# Patient Record
Sex: MALE | Race: WHITE | NOT HISPANIC OR LATINO | Employment: OTHER | URBAN - METROPOLITAN AREA
[De-identification: names, ages, dates, MRNs, and addresses within clinical notes are randomized per-mention and may not be internally consistent; named-entity substitution may affect disease eponyms.]

---

## 2018-01-30 ENCOUNTER — HOSPITAL ENCOUNTER (OUTPATIENT)
Facility: MEDICAL CENTER | Age: 83
End: 2018-01-30
Attending: OPHTHALMOLOGY | Admitting: OPHTHALMOLOGY
Payer: MEDICARE

## 2018-01-30 VITALS
HEIGHT: 72 IN | RESPIRATION RATE: 16 BRPM | HEART RATE: 59 BPM | SYSTOLIC BLOOD PRESSURE: 125 MMHG | BODY MASS INDEX: 19.56 KG/M2 | OXYGEN SATURATION: 98 % | TEMPERATURE: 97.8 F | DIASTOLIC BLOOD PRESSURE: 77 MMHG | WEIGHT: 144.4 LBS

## 2018-01-30 LAB
ANION GAP SERPL CALC-SCNC: 5 MMOL/L (ref 0–11.9)
BUN SERPL-MCNC: 20 MG/DL (ref 8–22)
CALCIUM SERPL-MCNC: 9.7 MG/DL (ref 8.5–10.5)
CHLORIDE SERPL-SCNC: 107 MMOL/L (ref 96–112)
CO2 SERPL-SCNC: 28 MMOL/L (ref 20–33)
CREAT SERPL-MCNC: 0.88 MG/DL (ref 0.5–1.4)
EKG IMPRESSION: NORMAL
GLUCOSE SERPL-MCNC: 95 MG/DL (ref 65–99)
POTASSIUM SERPL-SCNC: 4.1 MMOL/L (ref 3.6–5.5)
SODIUM SERPL-SCNC: 140 MMOL/L (ref 135–145)

## 2018-01-30 PROCEDURE — 80048 BASIC METABOLIC PNL TOTAL CA: CPT

## 2018-01-30 PROCEDURE — 500791 HCHG KNIFE, SLIT: Performed by: OPHTHALMOLOGY

## 2018-01-30 PROCEDURE — 99152 MOD SED SAME PHYS/QHP 5/>YRS: CPT | Performed by: OPHTHALMOLOGY

## 2018-01-30 PROCEDURE — 160048 HCHG OR STATISTICAL LEVEL 1-5: Performed by: OPHTHALMOLOGY

## 2018-01-30 PROCEDURE — 160002 HCHG RECOVERY MINUTES (STAT): Performed by: OPHTHALMOLOGY

## 2018-01-30 PROCEDURE — 160035 HCHG PACU - 1ST 60 MINS PHASE I: Performed by: OPHTHALMOLOGY

## 2018-01-30 PROCEDURE — 500558 HCHG EYE SHIELD W/GARTER (FOX): Performed by: OPHTHALMOLOGY

## 2018-01-30 PROCEDURE — 700101 HCHG RX REV CODE 250

## 2018-01-30 PROCEDURE — 99153 MOD SED SAME PHYS/QHP EA: CPT | Performed by: OPHTHALMOLOGY

## 2018-01-30 PROCEDURE — 700111 HCHG RX REV CODE 636 W/ 250 OVERRIDE (IP)

## 2018-01-30 PROCEDURE — A6410 STERILE EYE PAD: HCPCS | Performed by: OPHTHALMOLOGY

## 2018-01-30 PROCEDURE — 160036 HCHG PACU - EA ADDL 30 MINS PHASE I: Performed by: OPHTHALMOLOGY

## 2018-01-30 PROCEDURE — 160041 HCHG SURGERY MINUTES - EA ADDL 1 MIN LEVEL 4: Performed by: OPHTHALMOLOGY

## 2018-01-30 PROCEDURE — 93005 ELECTROCARDIOGRAM TRACING: CPT | Performed by: OPHTHALMOLOGY

## 2018-01-30 PROCEDURE — 87102 FUNGUS ISOLATION CULTURE: CPT

## 2018-01-30 PROCEDURE — 501425 HCHG SPONGE, WECKCEL SPEAR: Performed by: OPHTHALMOLOGY

## 2018-01-30 PROCEDURE — 160029 HCHG SURGERY MINUTES - 1ST 30 MINS LEVEL 4: Performed by: OPHTHALMOLOGY

## 2018-01-30 PROCEDURE — 93010 ELECTROCARDIOGRAM REPORT: CPT | Performed by: INTERNAL MEDICINE

## 2018-01-30 RX ORDER — MOXIFLOXACIN 5 MG/ML
SOLUTION/ DROPS OPHTHALMIC
Status: COMPLETED
Start: 2018-01-30 | End: 2018-01-30

## 2018-01-30 RX ORDER — BUPIVACAINE HYDROCHLORIDE 7.5 MG/ML
INJECTION, SOLUTION EPIDURAL; RETROBULBAR
Status: DISCONTINUED | OUTPATIENT
Start: 2018-01-30 | End: 2018-01-30 | Stop reason: HOSPADM

## 2018-01-30 RX ORDER — SODIUM CHLORIDE, SODIUM LACTATE, POTASSIUM CHLORIDE, CALCIUM CHLORIDE 600; 310; 30; 20 MG/100ML; MG/100ML; MG/100ML; MG/100ML
INJECTION, SOLUTION INTRAVENOUS CONTINUOUS
Status: DISCONTINUED | OUTPATIENT
Start: 2018-01-30 | End: 2018-01-30

## 2018-01-30 RX ORDER — SODIUM CHLORIDE, SODIUM LACTATE, POTASSIUM CHLORIDE, CALCIUM CHLORIDE 600; 310; 30; 20 MG/100ML; MG/100ML; MG/100ML; MG/100ML
INJECTION, SOLUTION INTRAVENOUS CONTINUOUS
Status: DISCONTINUED | OUTPATIENT
Start: 2018-01-30 | End: 2018-01-30 | Stop reason: HOSPADM

## 2018-01-30 RX ORDER — TETRACAINE HYDROCHLORIDE 5 MG/ML
SOLUTION OPHTHALMIC
Status: DISCONTINUED | OUTPATIENT
Start: 2018-01-30 | End: 2018-01-30 | Stop reason: HOSPADM

## 2018-01-30 RX ORDER — LIDOCAINE HYDROCHLORIDE 20 MG/ML
INJECTION, SOLUTION EPIDURAL; INFILTRATION; INTRACAUDAL; PERINEURAL
Status: DISCONTINUED | OUTPATIENT
Start: 2018-01-30 | End: 2018-01-30 | Stop reason: HOSPADM

## 2018-01-30 RX ADMIN — MOXIFLOXACIN HYDROCHLORIDE 1 DROP: 5 SOLUTION/ DROPS OPHTHALMIC at 08:25

## 2018-01-30 RX ADMIN — SODIUM CHLORIDE, SODIUM LACTATE, POTASSIUM CHLORIDE, CALCIUM CHLORIDE: 600; 310; 30; 20 INJECTION, SOLUTION INTRAVENOUS at 08:30

## 2018-01-30 ASSESSMENT — PAIN SCALES - GENERAL
PAINLEVEL_OUTOF10: 0

## 2018-01-30 NOTE — OR NURSING
0947.  To pacu in stable condition.  VS stable.  LS clear bilat.  Dressing to right eye d/i..  Pt flat for 60 min per MD order.  1030. Friend at bedside.  Dc instr. Reviewed.  VS stable.    1100  No c/o pain,Nausea  Or vomiting.  Up to dress and void at 1115.  Ready to dc home. IV out.  1120 dc home via WC to friend.

## 2018-01-30 NOTE — DISCHARGE INSTRUCTIONS
ACTIVITY: Rest and take it easy for the first 24 hours.  A responsible adult is recommended to remain with you during that time.  It is normal to feel sleepy.  We encourage you to not do anything that requires balance, judgment or coordination.    MILD FLU-LIKE SYMPTOMS ARE NORMAL. YOU MAY EXPERIENCE GENERALIZED MUSCLE ACHES, THROAT IRRITATION, HEADACHE AND/OR SOME NAUSEA.    FOR 24 HOURS DO NOT:  Drive, operate machinery or run household appliances.  Drink beer or alcoholic beverages.   Make important decisions or sign legal documents.    SPECIAL INSTRUCTIONS: *remain as flat as possible until your appointment with the doctor 1-31-18*, no bending , stooping, or lifting until approved by MD*    DIET: To avoid nausea, slowly advance diet as tolerated, avoiding spicy or greasy foods for the first day.  Add more substantial food to your diet according to your physician's instructions.  Babies can be fed formula or breast milk as soon as they are hungry.  INCREASE FLUIDS AND FIBER TO AVOID CONSTIPATION.    SURGICAL DRESSING/BATHING: *keep face dry.  Keep eye shield on at all times**    FOLLOW-UP APPOINTMENT:  A follow-up appointment should be arranged with your doctor , call to schedule.    You should CALL YOUR PHYSICIAN if you develop:  Fever greater than 101 degrees F.  Pain not relieved by medication, or persistent nausea or vomiting.  Excessive bleeding (blood soaking through dressing) or unexpected drainage from the wound.  Extreme redness or swelling around the incision site, drainage of pus or foul smelling drainage.  Inability to urinate or empty your bladder within 8 hours.  Problems with breathing or chest pain.    You should call 911 if you develop problems with breathing or chest pain.  If you are unable to contact your doctor or surgical center, you should go to the nearest emergency room or urgent care center.  Physician's telephone #: *[phone]**    If any questions arise, call your doctor.  If your  doctor is not available, please feel free to call the Surgical Center at [phone].  The Center is open Monday through Friday from 7AM to 7PM.  You can also call the HEALTH HOTLINE open 24 hours/day, 7 days/week and speak to a nurse at [phone], or toll free at [phone].    A registered nurse may call you a few days after your surgery to see how you are doing after your procedure.    MEDICATIONS: Resume taking daily medication.  Take prescribed pain medication with food.  If no medication is prescribed, you may take non-aspirin pain medication if needed.  PAIN MEDICATION CAN BE VERY CONSTIPATING.  Take a stool softener or laxative such as senokot, pericolace, or milk of magnesia if needed.    *.    If your physician has prescribed pain medication that includes Acetaminophen (Tylenol), do not take additional Acetaminophen (Tylenol) while taking the prescribed medication.    Depression / Suicide Risk    As you are discharged from this [facility] facility, it is important to learn how to keep safe from harming yourself.    Recognize the warning signs:  · Abrupt changes in personality, positive or negative- including increase in energy   · Giving away possessions  · Change in eating patterns- significant weight changes-  positive or negative  · Change in sleeping patterns- unable to sleep or sleeping all the time   · Unwillingness or inability to communicate  · Depression  · Unusual sadness, discouragement and loneliness  · Talk of wanting to die  · Neglect of personal appearance   · Rebelliousness- reckless behavior  · Withdrawal from people/activities they love  · Confusion- inability to concentrate     If you or a loved one observes any of these behaviors or has concerns about self-harm, here's what you can do:  · Talk about it- your feelings and reasons for harming yourself  · Remove any means that you might use to hurt yourself (examples: pills, rope, extension cords, firearm)  · Get professional  help from the community (Mental Health, Substance Abuse, psychological counseling)  · Do not be alone:Call your Safe Contact- someone whom you trust who will be there for you.  · Call your local CRISIS HOTLINE 797-4323 or 605-623-3102  · Call your local Children's Mobile Crisis Response Team [place] (725) 348-9675 or www.Annexon  · Call the toll free National Suicide Prevention Hotlines   · National Suicide Prevention Lifeline 369-764-WGIC (2148)  · National Hope Line Network 800-SUICIDE (154-9364)

## 2018-02-02 NOTE — OP REPORT
DATE OF SERVICE:  01/30/2018    PROCEDURES PERFORMED:  Descemet stripping endothelial keratoplasty, right eye.    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    INDICATIONS FOR SURGERY:  This is a patient with previous penetrating   keratoplasty from another surgeon with subsequent endothelial failure and   corneal decompensation requiring endothelial replacement.    ANESTHESIA:  Topical and monitored anesthesia with Sub-Tenon's block.    COMPLICATIONS:  None.    BLOOD LOSS:  Minimal.    SPECIMENS:  Donor corneal rim for fungal culture.    DESCRIPTION OF PROCEDURE:  Risks, benefits, alternatives and indications for   surgery were reviewed with the patient preoperatively and all questions were   answered.  Informed consent was obtained and the patient was brought to the   operating room on the day of surgery, and the right eye was prepped and   draped.  The lid speculum was placed and 2 paracenteses placed at the limbus   followed by instillation of Healon in the anterior chamber.  A temporal clear   corneal wound was then made and reverse Sinskey used to remove the existing   endothelium and Descemet on the corneal graft.  The patient already had a   large iridotomy superiorly from a previous glaucoma surgery and thus no   iridotomy was performed.  The Healon was removed from the anterior chamber   using irrigation and aspiration and attention was brought to the corneal   graft.  This graft tissue was from the [facility] and was   pre-sectioned for Descemet striping procedure.  The graft was cut with a   suction punch to a diameter of 8.0 mm loaded into a mini-Busin glide and then   inserted into the chamber, where it was brought in with insertion forceps.    The graft was then fixated to the posterior stroma using air bubble, followed   by 10 minute high pressure air fill.   A small air fluid exchange was performed   in the anterior chamber.  Following that, after 2 interrupted 10-0 nylon   sutures were placed in the main corneal wound.  The patient's eye was gently   patched and shielded and the patient discharged to the postanesthesia care   unit in stable condition with plans for followup with Dr. Heath the next day   in the eye clinic.       ____________________________________     MD KATHY Ceballos / CARLIN    DD:  02/02/2018 07:54:39  DT:  02/02/2018 09:18:32    D#:  [id number]  Job#:  [id number]

## 2018-02-26 LAB
FUNGUS SPEC CULT: NORMAL
SIGNIFICANT IND 70042: NORMAL
SITE SITE: NORMAL
SOURCE SOURCE: NORMAL

## 2018-02-27 ENCOUNTER — HOSPITAL ENCOUNTER (OUTPATIENT)
Facility: MEDICAL CENTER | Age: 83
End: 2018-02-27
Attending: OPHTHALMOLOGY | Admitting: OPHTHALMOLOGY
Payer: MEDICARE

## 2018-02-27 VITALS
OXYGEN SATURATION: 95 % | BODY MASS INDEX: 20.83 KG/M2 | HEIGHT: 70 IN | WEIGHT: 145.5 LBS | RESPIRATION RATE: 16 BRPM | DIASTOLIC BLOOD PRESSURE: 76 MMHG | HEART RATE: 67 BPM | SYSTOLIC BLOOD PRESSURE: 146 MMHG | TEMPERATURE: 97.6 F

## 2018-02-27 PROCEDURE — 160048 HCHG OR STATISTICAL LEVEL 1-5: Performed by: OPHTHALMOLOGY

## 2018-02-27 PROCEDURE — 502000 HCHG MISC OR IMPLANTS RC 0278: Performed by: OPHTHALMOLOGY

## 2018-02-27 PROCEDURE — 700111 HCHG RX REV CODE 636 W/ 250 OVERRIDE (IP)

## 2018-02-27 PROCEDURE — 700102 HCHG RX REV CODE 250 W/ 637 OVERRIDE(OP)

## 2018-02-27 PROCEDURE — 500558 HCHG EYE SHIELD W/GARTER (FOX): Performed by: OPHTHALMOLOGY

## 2018-02-27 PROCEDURE — 160029 HCHG SURGERY MINUTES - 1ST 30 MINS LEVEL 4: Performed by: OPHTHALMOLOGY

## 2018-02-27 PROCEDURE — 160036 HCHG PACU - EA ADDL 30 MINS PHASE I: Performed by: OPHTHALMOLOGY

## 2018-02-27 PROCEDURE — 160002 HCHG RECOVERY MINUTES (STAT): Performed by: OPHTHALMOLOGY

## 2018-02-27 PROCEDURE — 700101 HCHG RX REV CODE 250

## 2018-02-27 PROCEDURE — 160009 HCHG ANES TIME/MIN: Performed by: OPHTHALMOLOGY

## 2018-02-27 PROCEDURE — 87102 FUNGUS ISOLATION CULTURE: CPT

## 2018-02-27 PROCEDURE — 501425 HCHG SPONGE, WECKCEL SPEAR: Performed by: OPHTHALMOLOGY

## 2018-02-27 PROCEDURE — 160041 HCHG SURGERY MINUTES - EA ADDL 1 MIN LEVEL 4: Performed by: OPHTHALMOLOGY

## 2018-02-27 PROCEDURE — A9270 NON-COVERED ITEM OR SERVICE: HCPCS

## 2018-02-27 PROCEDURE — 502240 HCHG MISC OR SUPPLY RC 0272: Performed by: OPHTHALMOLOGY

## 2018-02-27 PROCEDURE — A6410 STERILE EYE PAD: HCPCS | Performed by: OPHTHALMOLOGY

## 2018-02-27 PROCEDURE — 160035 HCHG PACU - 1ST 60 MINS PHASE I: Performed by: OPHTHALMOLOGY

## 2018-02-27 RX ORDER — MANNITOL 20 G/100ML
INJECTION, SOLUTION INTRAVENOUS
Status: COMPLETED
Start: 2018-02-27 | End: 2018-02-27

## 2018-02-27 RX ORDER — MOXIFLOXACIN 5 MG/ML
SOLUTION/ DROPS OPHTHALMIC
Status: COMPLETED
Start: 2018-02-27 | End: 2018-02-27

## 2018-02-27 RX ORDER — LIDOCAINE HYDROCHLORIDE 20 MG/ML
INJECTION, SOLUTION INFILTRATION; PERINEURAL
Status: DISCONTINUED
Start: 2018-02-27 | End: 2018-02-27 | Stop reason: HOSPADM

## 2018-02-27 RX ORDER — PILOCARPINE HYDROCHLORIDE 20 MG/ML
SOLUTION/ DROPS OPHTHALMIC
Status: COMPLETED
Start: 2018-02-27 | End: 2018-02-27

## 2018-02-27 RX ORDER — SODIUM CHLORIDE, SODIUM LACTATE, POTASSIUM CHLORIDE, CALCIUM CHLORIDE 600; 310; 30; 20 MG/100ML; MG/100ML; MG/100ML; MG/100ML
INJECTION, SOLUTION INTRAVENOUS CONTINUOUS
Status: DISCONTINUED | OUTPATIENT
Start: 2018-02-27 | End: 2018-02-27 | Stop reason: HOSPADM

## 2018-02-27 RX ADMIN — PILOCARPINE HYDROCHLORIDE 1 DROP: 20 SOLUTION/ DROPS OPHTHALMIC at 13:30

## 2018-02-27 RX ADMIN — MANNITOL 16.5 G: 20 INJECTION, SOLUTION INTRAVENOUS at 13:45

## 2018-02-27 RX ADMIN — MOXIFLOXACIN HYDROCHLORIDE 1 DROP: 5 SOLUTION/ DROPS OPHTHALMIC at 13:30

## 2018-02-27 RX ADMIN — SODIUM CHLORIDE, SODIUM LACTATE, POTASSIUM CHLORIDE, CALCIUM CHLORIDE 1000 ML: 600; 310; 30; 20 INJECTION, SOLUTION INTRAVENOUS at 13:40

## 2018-02-27 ASSESSMENT — PAIN SCALES - GENERAL
PAINLEVEL_OUTOF10: 0

## 2018-02-28 NOTE — PROGRESS NOTES
1615 Handoff from LJ Arevalo    1645 Pt more awake and alert. Up to bathroom. Able to void without difficulty. Pt is steady with ambulation.     1720 Pt's friend to bedside. Discharge instructions provided. All questions answered at this time.     1740 Pt discharge. Pt has all belongings. Pt refused wheelchair to car. Escorted to [employer] parking garage. Pt steady on feet.

## 2018-02-28 NOTE — OR NURSING
1605 Received pt from OR, received report from Dr. Bergeron. Pt sleeping, VS WNL. Pt has eye patch to right eye.     1615 Report to Emily CALHOUN.

## 2018-02-28 NOTE — OP REPORT
DATE OF SERVICE:  02/27/2018    PREOPERATIVE DIAGNOSIS:  Failed corneal graft, right eye.    POSTOPERATIVE DIAGNOSIS:  Failed corneal graft, right eye.    INDICATIONS FOR SURGERY:  This is a patient who had a previous corneal   transplant full thickness several years ago, which had subsequently   decompensated on whom I performed an endothelial keratoplasty in the previous   month underneath the existing graft.  This graft did not attach at any point   and despite a rebubbling effort, still was unattached, and thus the decision   to proceed with a full thickness corneal transplant to replace the failed   graft was made.    ANESTHESIA:  General endotracheal.    COMPLICATIONS:  None.    BLOOD LOSS:  Minimal.    SPECIMENS:  Donor corneal rim for fungal culture.    DESCRIPTION OF PROCEDURE:  Risks, benefits, alternatives and indications for   surgery were reviewed with patient preoperative and all questions were   answered.  Informed consent was obtained and the patient was brought to the   operating room where general anesthesia was administered, and the eye was   prepped and draped.  A lid speculum was placed in the right eye and a failed   cornea was observed.  A bandage contact lens previously placed was removed at   the start of the case from the eye.  The corneal center was marked and the   cornea was then removed to a diameter of 8.0 mm using a suction trephine after   which the donor corneal tissue, which had been previously trephinated by me   to a diameter of 8.25 mm was placed over a bed of Healon after the unattached   defect graft was removed from the chamber.  A total of 16 interrupted 10-0   nylon sutures were then used to attach the donor cornea and their knots buried   into the corneal stroma at the end of the case.  Periodically, the anterior   chamber was reformed and Healon was flushed out.  At the end of the case, the   chamber was seen to be deep and no significant wound leak was observed at the    graft-host junction.  The patient was given subconjunctival Ancef and   dexamethasone at the end of the case and the eye was firmly patched and   shielded.  The patient tolerated the procedure well and was discharged to the   postanesthesia care unit in stable condition with plans for followup with Dr. Heath the next day in the eye clinic.       ____________________________________     MD KATHY Ceballos / CARLIN    DD:  02/28/2018 12:52:42  DT:  02/28/2018 13:22:46    D#:  [id number]  Job#:  [id number]

## 2018-02-28 NOTE — DISCHARGE INSTRUCTIONS
ACTIVITY: Rest and take it easy for the first 24 hours.  A responsible adult is recommended to remain with you during that time.  It is normal to feel sleepy.  We encourage you to not do anything that requires balance, judgment or coordination.    MILD FLU-LIKE SYMPTOMS ARE NORMAL. YOU MAY EXPERIENCE GENERALIZED MUSCLE ACHES, THROAT IRRITATION, HEADACHE AND/OR SOME NAUSEA.    FOR 24 HOURS DO NOT:  Drive, operate machinery or run household appliances.  Drink beer or alcoholic beverages.   Make important decisions or sign legal documents.    SPECIAL INSTRUCTIONS: *Keep eye patch on at all times until follow up with MD. No bending, straining, stooping, or lifting until approved by MD**    DIET: To avoid nausea, slowly advance diet as tolerated, avoiding spicy or greasy foods for the first day.  Add more substantial food to your diet according to your physician's instructions.  Babies can be fed formula or breast milk as soon as they are hungry.  INCREASE FLUIDS AND FIBER TO AVOID CONSTIPATION.      FOLLOW-UP APPOINTMENT:  A follow-up appointment should be arranged with your doctor to call to schedule.    You should CALL YOUR PHYSICIAN if you develop:  Fever greater than 101 degrees F.  Pain not relieved by medication, or persistent nausea or vomiting.  Excessive bleeding (blood soaking through dressing) or unexpected drainage from the wound.  Extreme redness or swelling around the incision site, drainage of pus or foul smelling drainage.  Inability to urinate or empty your bladder within 8 hours.  Problems with breathing or chest pain.    You should call 911 if you develop problems with breathing or chest pain.  If you are unable to contact your doctor or surgical center, you should go to the nearest emergency room or urgent care center.  Physician's telephone #: *[phone]**    If any questions arise, call your doctor.  If your doctor is not available, please feel free to call the Surgical Center at [phone].   The Center is open Monday through Friday from 7AM to 7PM.  You can also call the HEALTH HOTLINE open 24 hours/day, 7 days/week and speak to a nurse at [phone], or toll free at [phone].    A registered nurse may call you a few days after your surgery to see how you are doing after your procedure.    MEDICATIONS: Resume taking daily medication.  Take prescribed pain medication with food.  If no medication is prescribed, you may take non-aspirin pain medication if needed.  PAIN MEDICATION CAN BE VERY CONSTIPATING.  Take a stool softener or laxative such as senokot, pericolace, or milk of magnesia if needed.    Prescription given for *None**.  Last pain medication given at *None**.    If your physician has prescribed pain medication that includes Acetaminophen (Tylenol), do not take additional Acetaminophen (Tylenol) while taking the prescribed medication.    Depression / Suicide Risk    As you are discharged from this [facility] facility, it is important to learn how to keep safe from harming yourself.    Recognize the warning signs:  · Abrupt changes in personality, positive or negative- including increase in energy   · Giving away possessions  · Change in eating patterns- significant weight changes-  positive or negative  · Change in sleeping patterns- unable to sleep or sleeping all the time   · Unwillingness or inability to communicate  · Depression  · Unusual sadness, discouragement and loneliness  · Talk of wanting to die  · Neglect of personal appearance   · Rebelliousness- reckless behavior  · Withdrawal from people/activities they love  · Confusion- inability to concentrate     If you or a loved one observes any of these behaviors or has concerns about self-harm, here's what you can do:  · Talk about it- your feelings and reasons for harming yourself  · Remove any means that you might use to hurt yourself (examples: pills, rope, extension cords, firearm)  · Get professional help from the  community (Mental Health, Substance Abuse, psychological counseling)  · Do not be alone:Call your Safe Contact- someone whom you trust who will be there for you.  · Call your local CRISIS HOTLINE 485-7744 or 310-768-1281  · Call your local Children's Mobile Crisis Response Team [place] (774) 158-5107 or www.Retidoc  · Call the toll free National Suicide Prevention Hotlines   · National Suicide Prevention Lifeline 219-220-QFFS (0225)  · National Hope Line Network 800-SUICIDE (457-8297)

## 2018-03-28 LAB
FUNGUS SPEC CULT: NORMAL
SIGNIFICANT IND 70042: NORMAL
SITE SITE: NORMAL
SOURCE SOURCE: NORMAL

## 2019-01-28 ENCOUNTER — PATIENT OUTREACH (OUTPATIENT)
Dept: HEALTH INFORMATION MANAGEMENT | Facility: OTHER | Age: 84
End: 2019-01-28

## 2019-01-30 NOTE — PROGRESS NOTES
Outcome: Left Message    Please transfer to Patient Outreach Team at [phone] when patient returns call.    WebIZ Checked & Epic Updated:  yes    HealthConnect Verified: yes    Attempt # 2

## 2019-04-09 ENCOUNTER — HOSPITAL ENCOUNTER (OUTPATIENT)
Facility: MEDICAL CENTER | Age: 84
End: 2019-04-09
Attending: OPHTHALMOLOGY | Admitting: OPHTHALMOLOGY
Payer: MEDICARE

## 2019-04-09 ENCOUNTER — ANESTHESIA EVENT (OUTPATIENT)
Dept: SURGERY | Facility: MEDICAL CENTER | Age: 84
End: 2019-04-09
Payer: MEDICARE

## 2019-04-09 ENCOUNTER — ANESTHESIA (OUTPATIENT)
Dept: SURGERY | Facility: MEDICAL CENTER | Age: 84
End: 2019-04-09
Payer: MEDICARE

## 2019-04-09 VITALS
TEMPERATURE: 97.5 F | WEIGHT: 144.62 LBS | HEART RATE: 65 BPM | OXYGEN SATURATION: 96 % | HEIGHT: 71 IN | RESPIRATION RATE: 16 BRPM | BODY MASS INDEX: 20.25 KG/M2

## 2019-04-09 PROBLEM — C61 PROSTATE CA (HCC): Status: ACTIVE | Noted: 2019-04-09

## 2019-04-09 PROBLEM — E78.5 HYPERLIPIDEMIA: Status: ACTIVE | Noted: 2019-04-09

## 2019-04-09 PROBLEM — R73.03 PRE-DIABETES: Status: ACTIVE | Noted: 2019-04-09

## 2019-04-09 PROBLEM — Z72.0 TOBACCO USE: Status: ACTIVE | Noted: 2019-04-09

## 2019-04-09 PROBLEM — H40.9 GLAUCOMA: Status: ACTIVE | Noted: 2019-04-09

## 2019-04-09 LAB — EKG IMPRESSION: NORMAL

## 2019-04-09 PROCEDURE — 160048 HCHG OR STATISTICAL LEVEL 1-5: Performed by: OPHTHALMOLOGY

## 2019-04-09 PROCEDURE — A6410 STERILE EYE PAD: HCPCS | Performed by: OPHTHALMOLOGY

## 2019-04-09 PROCEDURE — 700101 HCHG RX REV CODE 250

## 2019-04-09 PROCEDURE — 160041 HCHG SURGERY MINUTES - EA ADDL 1 MIN LEVEL 4: Performed by: OPHTHALMOLOGY

## 2019-04-09 PROCEDURE — 93005 ELECTROCARDIOGRAM TRACING: CPT | Performed by: OPHTHALMOLOGY

## 2019-04-09 PROCEDURE — 93010 ELECTROCARDIOGRAM REPORT: CPT | Performed by: INTERNAL MEDICINE

## 2019-04-09 PROCEDURE — 160002 HCHG RECOVERY MINUTES (STAT): Performed by: OPHTHALMOLOGY

## 2019-04-09 PROCEDURE — 160025 RECOVERY II MINUTES (STATS): Performed by: OPHTHALMOLOGY

## 2019-04-09 PROCEDURE — 502240 HCHG MISC OR SUPPLY RC 0272: Performed by: OPHTHALMOLOGY

## 2019-04-09 PROCEDURE — 700111 HCHG RX REV CODE 636 W/ 250 OVERRIDE (IP)

## 2019-04-09 PROCEDURE — 500558 HCHG EYE SHIELD W/GARTER (FOX): Performed by: OPHTHALMOLOGY

## 2019-04-09 PROCEDURE — 700102 HCHG RX REV CODE 250 W/ 637 OVERRIDE(OP)

## 2019-04-09 PROCEDURE — A9270 NON-COVERED ITEM OR SERVICE: HCPCS

## 2019-04-09 PROCEDURE — 160046 HCHG PACU - 1ST 60 MINS PHASE II: Performed by: OPHTHALMOLOGY

## 2019-04-09 PROCEDURE — 502000 HCHG MISC OR IMPLANTS RC 0278: Performed by: OPHTHALMOLOGY

## 2019-04-09 PROCEDURE — 160009 HCHG ANES TIME/MIN: Performed by: OPHTHALMOLOGY

## 2019-04-09 PROCEDURE — 87102 FUNGUS ISOLATION CULTURE: CPT

## 2019-04-09 PROCEDURE — 700111 HCHG RX REV CODE 636 W/ 250 OVERRIDE (IP): Performed by: ANESTHESIOLOGY

## 2019-04-09 PROCEDURE — 700101 HCHG RX REV CODE 250: Performed by: OPHTHALMOLOGY

## 2019-04-09 PROCEDURE — 700101 HCHG RX REV CODE 250: Performed by: ANESTHESIOLOGY

## 2019-04-09 PROCEDURE — 501425 HCHG SPONGE, WECKCEL SPEAR: Performed by: OPHTHALMOLOGY

## 2019-04-09 PROCEDURE — 160035 HCHG PACU - 1ST 60 MINS PHASE I: Performed by: OPHTHALMOLOGY

## 2019-04-09 PROCEDURE — 160029 HCHG SURGERY MINUTES - 1ST 30 MINS LEVEL 4: Performed by: OPHTHALMOLOGY

## 2019-04-09 RX ORDER — ONDANSETRON 2 MG/ML
INJECTION INTRAMUSCULAR; INTRAVENOUS PRN
Status: DISCONTINUED | OUTPATIENT
Start: 2019-04-09 | End: 2019-04-09 | Stop reason: SURG

## 2019-04-09 RX ORDER — SODIUM CHLORIDE, SODIUM LACTATE, POTASSIUM CHLORIDE, CALCIUM CHLORIDE 600; 310; 30; 20 MG/100ML; MG/100ML; MG/100ML; MG/100ML
INJECTION, SOLUTION INTRAVENOUS CONTINUOUS
Status: DISCONTINUED | OUTPATIENT
Start: 2019-04-09 | End: 2019-04-09 | Stop reason: HOSPADM

## 2019-04-09 RX ORDER — BALANCED SALT SOLUTION 6.4; .75; .48; .3; 3.9; 1.7 MG/ML; MG/ML; MG/ML; MG/ML; MG/ML; MG/ML
SOLUTION OPHTHALMIC
Status: DISCONTINUED | OUTPATIENT
Start: 2019-04-09 | End: 2019-04-09 | Stop reason: HOSPADM

## 2019-04-09 RX ORDER — MOXIFLOXACIN 5 MG/ML
SOLUTION/ DROPS OPHTHALMIC
Status: COMPLETED
Start: 2019-04-09 | End: 2019-04-09

## 2019-04-09 RX ORDER — HALOPERIDOL 5 MG/ML
1 INJECTION INTRAMUSCULAR
Status: DISCONTINUED | OUTPATIENT
Start: 2019-04-09 | End: 2019-04-09 | Stop reason: HOSPADM

## 2019-04-09 RX ORDER — DIPHENHYDRAMINE HYDROCHLORIDE 50 MG/ML
12.5 INJECTION INTRAMUSCULAR; INTRAVENOUS
Status: DISCONTINUED | OUTPATIENT
Start: 2019-04-09 | End: 2019-04-09 | Stop reason: HOSPADM

## 2019-04-09 RX ORDER — OXYCODONE HYDROCHLORIDE AND ACETAMINOPHEN 5; 325 MG/1; MG/1
1 TABLET ORAL
Status: DISCONTINUED | OUTPATIENT
Start: 2019-04-09 | End: 2019-04-09 | Stop reason: HOSPADM

## 2019-04-09 RX ORDER — MOXIFLOXACIN 5 MG/ML
SOLUTION/ DROPS OPHTHALMIC
Status: DISCONTINUED | OUTPATIENT
Start: 2019-04-09 | End: 2019-04-09 | Stop reason: HOSPADM

## 2019-04-09 RX ORDER — DEXAMETHASONE SODIUM PHOSPHATE 4 MG/ML
INJECTION, SOLUTION INTRA-ARTICULAR; INTRALESIONAL; INTRAMUSCULAR; INTRAVENOUS; SOFT TISSUE
Status: DISCONTINUED | OUTPATIENT
Start: 2019-04-09 | End: 2019-04-09 | Stop reason: HOSPADM

## 2019-04-09 RX ORDER — OXYCODONE HYDROCHLORIDE AND ACETAMINOPHEN 5; 325 MG/1; MG/1
2 TABLET ORAL
Status: DISCONTINUED | OUTPATIENT
Start: 2019-04-09 | End: 2019-04-09 | Stop reason: HOSPADM

## 2019-04-09 RX ORDER — PILOCARPINE HYDROCHLORIDE 20 MG/ML
SOLUTION/ DROPS OPHTHALMIC
Status: COMPLETED
Start: 2019-04-09 | End: 2019-04-09

## 2019-04-09 RX ORDER — HYDRALAZINE HYDROCHLORIDE 20 MG/ML
5 INJECTION INTRAMUSCULAR; INTRAVENOUS
Status: DISCONTINUED | OUTPATIENT
Start: 2019-04-09 | End: 2019-04-09 | Stop reason: HOSPADM

## 2019-04-09 RX ORDER — TETRACAINE HYDROCHLORIDE 5 MG/ML
SOLUTION OPHTHALMIC
Status: DISCONTINUED | OUTPATIENT
Start: 2019-04-09 | End: 2019-04-09 | Stop reason: HOSPADM

## 2019-04-09 RX ORDER — CEFAZOLIN SODIUM 1 G/3ML
INJECTION, POWDER, FOR SOLUTION INTRAMUSCULAR; INTRAVENOUS
Status: DISCONTINUED | OUTPATIENT
Start: 2019-04-09 | End: 2019-04-09 | Stop reason: HOSPADM

## 2019-04-09 RX ORDER — METOPROLOL TARTRATE 1 MG/ML
1 INJECTION, SOLUTION INTRAVENOUS
Status: DISCONTINUED | OUTPATIENT
Start: 2019-04-09 | End: 2019-04-09 | Stop reason: HOSPADM

## 2019-04-09 RX ORDER — DEXAMETHASONE SODIUM PHOSPHATE 4 MG/ML
INJECTION, SOLUTION INTRA-ARTICULAR; INTRALESIONAL; INTRAMUSCULAR; INTRAVENOUS; SOFT TISSUE PRN
Status: DISCONTINUED | OUTPATIENT
Start: 2019-04-09 | End: 2019-04-09 | Stop reason: SURG

## 2019-04-09 RX ORDER — ONDANSETRON 2 MG/ML
4 INJECTION INTRAMUSCULAR; INTRAVENOUS
Status: DISCONTINUED | OUTPATIENT
Start: 2019-04-09 | End: 2019-04-09 | Stop reason: HOSPADM

## 2019-04-09 RX ORDER — MANNITOL 20 G/100ML
165 INJECTION, SOLUTION INTRAVENOUS
Status: COMPLETED | OUTPATIENT
Start: 2019-04-09 | End: 2019-04-09

## 2019-04-09 RX ORDER — MEPERIDINE HYDROCHLORIDE 25 MG/ML
6.25 INJECTION INTRAMUSCULAR; INTRAVENOUS; SUBCUTANEOUS
Status: DISCONTINUED | OUTPATIENT
Start: 2019-04-09 | End: 2019-04-09 | Stop reason: HOSPADM

## 2019-04-09 RX ADMIN — LIDOCAINE HYDROCHLORIDE 100 MG: 20 INJECTION, SOLUTION INTRAVENOUS at 08:37

## 2019-04-09 RX ADMIN — ONDANSETRON 4 MG: 2 INJECTION INTRAMUSCULAR; INTRAVENOUS at 08:37

## 2019-04-09 RX ADMIN — EPHEDRINE SULFATE 10 MG: 50 INJECTION INTRAMUSCULAR; INTRAVENOUS; SUBCUTANEOUS at 08:48

## 2019-04-09 RX ADMIN — PILOCARPINE HYDROCHLORIDE 1 DROP: 20 SOLUTION/ DROPS OPHTHALMIC at 08:23

## 2019-04-09 RX ADMIN — MOXIFLOXACIN HYDROCHLORIDE 1 DROP: 5 SOLUTION/ DROPS OPHTHALMIC at 08:24

## 2019-04-09 RX ADMIN — ROCURONIUM BROMIDE 10 MG: 10 INJECTION, SOLUTION INTRAVENOUS at 09:02

## 2019-04-09 RX ADMIN — EPHEDRINE SULFATE 10 MG: 50 INJECTION INTRAMUSCULAR; INTRAVENOUS; SUBCUTANEOUS at 09:00

## 2019-04-09 RX ADMIN — DEXAMETHASONE SODIUM PHOSPHATE 4 MG: 4 INJECTION, SOLUTION INTRA-ARTICULAR; INTRALESIONAL; INTRAMUSCULAR; INTRAVENOUS; SOFT TISSUE at 08:37

## 2019-04-09 RX ADMIN — SODIUM CHLORIDE, SODIUM LACTATE, POTASSIUM CHLORIDE, CALCIUM CHLORIDE: 600; 310; 30; 20 INJECTION, SOLUTION INTRAVENOUS at 08:26

## 2019-04-09 RX ADMIN — PROPOFOL 100 MG: 10 INJECTION, EMULSION INTRAVENOUS at 08:37

## 2019-04-09 RX ADMIN — SODIUM CHLORIDE, SODIUM LACTATE, POTASSIUM CHLORIDE, CALCIUM CHLORIDE: 600; 310; 30; 20 INJECTION, SOLUTION INTRAVENOUS at 08:32

## 2019-04-09 RX ADMIN — MANNITOL 165 ML/HR: 20 INJECTION, SOLUTION INTRAVENOUS at 08:26

## 2019-04-09 ASSESSMENT — PAIN SCALES - GENERAL: PAIN_LEVEL: 0

## 2019-04-09 NOTE — OP REPORT
DATE OF SERVICE:  04/09/2019    PROCEDURE PERFORMED:  Penetrating keratoplasty, right eye.    PREOPERATIVE DIAGNOSIS:  Failed corneal graft in the right eye.    POSTOPERATIVE DIAGNOSIS:  Failed corneal graft in the right eye.    INDICATIONS FOR SURGERY:  This is an elderly gentleman who presented with a   previous graft and subsequent failure who had an unsuccessful attempt at   endothelial keratoplasty under the existing graft with chronic bullous changes   and corneal edema requiring replacement of the entire graft in the right eye.    ANESTHESIA:  General endotracheal.    COMPLICATIONS:  None.    BLOOD LOSS:  Minimal.    SPECIMENS:  Donor corneal rim for culture only.    DESCRIPTION OF PROCEDURE:  Risks, benefits, alternatives and indications for   surgery were reviewed with the patient preoperatively and all questions were   answered, informed consent was obtained.  On the day of surgery, the patient   was brought to the operating room where the right eye was prepped and draped.    A lid speculum was placed.  Of note, the patient did receive IV mannitol   preoperatively for reduction of vitreous pressure.  A failed corneal graft   with partially attached endothelial graft was observed and diffuse bullous   changes as well as a large central epithelial defect.  The overlying   epithelium was removed beyond the existing graft host junction.  The corneal   center was marked using calipers and then an 8 blade RK marker used to brian   the borders of the cornea.  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  Death preservation time 19 hours 21 minutes for a total   ocular cooling time of 14 hours and 31 minutes.  Cell count as reported by the   eye bank is 2841 with a  clear zone of 8.5 mm had all serologies were negative   as reported by the eye bank.  This tissue was placed on a suction punch and   cut to a diameter of 9.5 mm, so as to encompass the preexisting graft.    Attention was then brought back to the patient's own cornea, which was then   trephinated to a diameter of 9.0 mm using a suction trephine.  The anterior   chamber was entered during trephination and then reformed using Healon.    Corneal scissors to the right and left were used to remove the newly   trephinated cornea, which did come out in conjunction with the existing failed   endothelial graft.  Bed of Healon was placed in the anterior chamber and the   graft placed endothelial side down over this and fixated using a total of 17   interrupted 10-0 nylon sutures.  All the knots of the suture were buried into   corneal stroma at the end of the case and at the end of the case, the chamber   was found to be holding shape well without fluid egress through graft host   junction.  Subconjunctival Ancef and dexamethasone was given at the end of the   case and the eye given topical moxifloxacin as well and firmly patched and   shielded.  The patient was discharged to postanesthesia care unit in stable   condition with plans to follow up with Dr. Heath the next day in the eye   clinic.       ____________________________________     MD KATHY Ceballos / CARLIN    DD:  04/09/2019 10:06:24  DT:  04/09/2019 10:31:32    D#:  [id number]  Job#:  [id number]

## 2019-04-09 NOTE — ANESTHESIA POSTPROCEDURE EVALUATION
Patient: Gagandeep Mauro    Procedure Summary     Date:  04/09/19 Room / Location:  [facility] ROOM 21 / SURGERY SAME DAY [facility]    Anesthesia Start:  0832 Anesthesia Stop:  1000    Procedure:  KERATOPLASTY, PENETRATING (Right Eye) Diagnosis:  (FAILED GRAFT)    Surgeon:  Yeyo Heath M.D. Responsible Provider:  Jann Maldonado M.D.    Anesthesia Type:  general ASA Status:  3          Final Anesthesia Type: general  Last vitals  BP   NIBP: 139/72    Temp   36.9 °C (98.5 °F)    Pulse   Pulse: 73, Heart Rate (Monitored): 73   Resp   16    SpO2   99 %      Anesthesia Post Evaluation    Patient location during evaluation: PACU  Patient participation: complete - patient participated  Level of consciousness: awake and alert  Pain score: 0    Airway patency: patent  Anesthetic complications: no  Cardiovascular status: adequate  Respiratory status: acceptable  Hydration status: acceptable    PONV: none           Nurse Pain Score: 0 (NPRS)

## 2019-04-09 NOTE — ANESTHESIA PROCEDURE NOTES
Airway  Date/Time: 4/9/2019 8:38 AM  Performed by: DALTON PEÑA  Authorized by: DALTON PEÑA     Location:  OR  Urgency:  Elective  Indications for Airway Management:  Anesthesia  Spontaneous Ventilation: absent    Sedation Level:  Deep  Preoxygenated: Yes    Patient Position:  Sniffing  Mask Difficulty Assessment:  1 - vent by mask  Final Airway Type:  Supraglottic airway  Final Supraglottic Airway:  Standard LMA  SGA Size:  4  Number of Attempts at Approach:  1

## 2019-04-09 NOTE — ANESTHESIA QCDR
2019 [facility] Clinical Data Registry (for Quality Improvement)     Postoperative nausea/vomiting risk protocol (Adult = 18 yrs and Pediatric 3-17 yrs)- (430 and 463)  General inhalation anesthetic (NOT TIVA) with PONV risk factors: Yes  Provision of anti-emetic therapy with at least 2 different classes of agents: Yes   Patient DID NOT receive anti-emetic therapy and reason is documented in Medical Record:  N/A    Multimodal Pain Management- (AQI59)  Patient undergoing Elective Surgery (i.e. Outpatient, or ASC, or Prescheduled Surgery prior to Hospital Admission): Yes  Use of Multimodal Pain Management, two or more drugs and/or interventions, NOT including systemic opioids: No   Exception: Documented allergy to multiple classes of analgesics:         PACU assessment of acute postoperative pain prior to Anesthesia Care End- Applies to Patients Age = 18- (ABG7)  Initial PACU pain score is which of the following: < 7/10  Patient unable to report pain score: N/A    Post-anesthetic transfer of care checklist/protocol to PACU/ICU- (426 and 427)  Upon conclusion of case, patient transferred to which of the following locations: PACU/Non-ICU  Use of transfer checklist/protocol: Yes  Exclusion: Service Performed in Patient Hospital Room (and thus did not require transfer): N/A    PACU Reintubation- (AQI31)  General anesthesia requiring endotracheal intubation (ETT) along with subsequent extubation in OR or PACU: Yes  Required reintubation in the PACU: No   Extubation was a planned trial documented in the medical record prior to removal of the original airway device:  N/A    Unplanned admission to ICU related to anesthesia service up through end of PACU care- (MD51)  Unplanned admission to ICU (not initially anticipated at anesthesia start time): No

## 2019-04-09 NOTE — ANESTHESIA TIME REPORT
Anesthesia Start and Stop Event Times     Date Time Event    4/9/2019 0832 Anesthesia Start     1000 Anesthesia Stop        Responsible Staff  04/09/19    Name Role Begin End    Jann Maldonado M.D. Anesth 0832 1000        Preop Diagnosis (Free Text):  Pre-op Diagnosis     FAILED GRAFT        Preop Diagnosis (Codes):  Diagnosis Information     Diagnosis Code(s):         Post op Diagnosis  Eye pain  Failed graft, keratoplasty    Premium Reason  Non-Premium    Comments:

## 2019-04-09 NOTE — DISCHARGE INSTRUCTIONS
ACTIVITY: Rest and take it easy for the first 24 hours.  A responsible adult is recommended to remain with you during that time.  It is normal to feel sleepy.  We encourage you to not do anything that requires balance, judgment or coordination.    MILD FLU-LIKE SYMPTOMS ARE NORMAL. YOU MAY EXPERIENCE GENERALIZED MUSCLE ACHES, THROAT IRRITATION, HEADACHE AND/OR SOME NAUSEA.    FOR 24 HOURS DO NOT:  Drive, operate machinery or run household appliances.  Drink beer or alcoholic beverages.   Make important decisions or sign legal documents.    SPECIAL INSTRUCTIONS: **Do NOT rub eye. Keep eye shield in place. Follow Dr. Heath's instructions given. NO bending, straining or stooping until approved by  *    DIET: To avoid nausea, slowly advance diet as tolerated, avoiding spicy or greasy foods for the first day.  Add more substantial food to your diet according to your physician's instructions.  Babies can be fed formula or breast milk as soon as they are hungry.  INCREASE FLUIDS AND FIBER TO AVOID CONSTIPATION.    SURGICAL DRESSING/BATHING: *May shower tomorrow after follow up appointment**    FOLLOW-UP APPOINTMENT:  A follow-up appointment should be arranged with your doctor in *tomorrow at 9:30AM**    You should CALL YOUR PHYSICIAN if you develop:  Fever greater than 101 degrees F.  Pain not relieved by medication, or persistent nausea or vomiting.  Excessive bleeding (blood soaking through dressing) or unexpected drainage from the wound.  Extreme redness or swelling around the incision site, drainage of pus or foul smelling drainage.  Inability to urinate or empty your bladder within 8 hours.  Problems with breathing or chest pain.    You should call 911 if you develop problems with breathing or chest pain.  If you are unable to contact your doctor or surgical center, you should go to the nearest emergency room or urgent care center.  Physician's telephone #: * [phone]**    If any questions arise,  call your doctor.  If your doctor is not available, please feel free to call the Surgical Center at [phone].  The Center is open Monday through Friday from 7AM to 7PM.  You can also call the HEALTH HOTLINE open 24 hours/day, 7 days/week and speak to a nurse at [phone], or toll free at [phone].    A registered nurse may call you a few days after your surgery to see how you are doing after your procedure.    MEDICATIONS: Resume taking daily medication.  Take prescribed pain medication with food.  If no medication is prescribed, you may take non-aspirin pain medication if needed.  PAIN MEDICATION CAN BE VERY CONSTIPATING.  Take a stool softener or laxative such as senokot, pericolace, or milk of magnesia if needed.    Prescription given for *none**.  Last pain medication given at ***.    If your physician has prescribed pain medication that includes Acetaminophen (Tylenol), do not take additional Acetaminophen (Tylenol) while taking the prescribed medication.    Depression / Suicide Risk    As you are discharged from this [facility] facility, it is important to learn how to keep safe from harming yourself.    Recognize the warning signs:  · Abrupt changes in personality, positive or negative- including increase in energy   · Giving away possessions  · Change in eating patterns- significant weight changes-  positive or negative  · Change in sleeping patterns- unable to sleep or sleeping all the time   · Unwillingness or inability to communicate  · Depression  · Unusual sadness, discouragement and loneliness  · Talk of wanting to die  · Neglect of personal appearance   · Rebelliousness- reckless behavior  · Withdrawal from people/activities they love  · Confusion- inability to concentrate     If you or a loved one observes any of these behaviors or has concerns about self-harm, here's what you can do:  · Talk about it- your feelings and reasons for harming yourself  · Remove any means that you  might use to hurt yourself (examples: pills, rope, extension cords, firearm)  · Get professional help from the community (Mental Health, Substance Abuse, psychological counseling)  · Do not be alone:Call your Safe Contact- someone whom you trust who will be there for you.  · Call your local CRISIS HOTLINE 529-4193 or 229-286-9084  · Call your local Children's Mobile Crisis Response Team [place] (208) 108-5447 or www.[employer]  · Call the toll free National Suicide Prevention Hotlines   · National Suicide Prevention Lifeline 164-891-ADZE (3950)  · National Hope Line Network 800-SUICIDE (098-3555)

## 2019-04-09 NOTE — ANESTHESIA PREPROCEDURE EVALUATION
84yoM with pmhx of HTN, glaucoma, preDM, HLD, hx prostate CA, +tobacco use here for keratoplasty.     Denies recent CPs. Denies COPD, but 1ppd x yrs. mild PARADA     Allergies to tetracycline  Meds: only meds for glaucoma, no systemic meds  No anesthetic complications in past    Relevant Problems   (+) Hyperlipidemia   (+) Hypertension   (+) Pre-diabetes   (+) Prostate CA (HCC)   (+) Tobacco use       Physical Exam    Airway   Mallampati: II       Cardiovascular - normal exam     Dental - normal exam         Pulmonary - normal exam     Abdominal    Neurological - normal exam               ASA 3 hx prostate ca, COPD,  HTN  Anesthesia Plan    ASA 3   ASA physical status 3 criteria: other (comment)    Plan - general       Airway plan will be LMA        Induction: intravenous      Pertinent diagnostic labs and testing reviewed    Informed Consent:    Anesthetic plan and risks discussed with patient.

## 2019-04-09 NOTE — OR NURSING
1115 Hand-off from RN Elvi  1124 Pt up and dressed, appears comfortable, d/cd via wheelchair, belongings with patient.

## 2019-04-09 NOTE — OR NURSING
1002 Pt arrived from OR, rec'd report from . VSS. No s/s of resp distress. Eye shield to R eye, no bleeding or swelling noted. Pt sleepy. Appears comfortable.   1020 Pt repositioned for comfort. Denies pain.   1045 Pt awake, sitting up, drinking water. Friend to bedside.   1110 Pt expressed readiness for d/c. Discussed d/c instructions with pt and pt's friend. /answered all questions. Pt verbalized understanding. Pt dressing self.

## 2019-05-08 LAB
FUNGUS SPEC CULT: NORMAL
SIGNIFICANT IND 70042: NORMAL
SITE SITE: NORMAL
SOURCE SOURCE: NORMAL

## 2020-07-29 PROBLEM — I35.0 NONRHEUMATIC AORTIC VALVE STENOSIS: Status: ACTIVE | Noted: 2020-07-29

## 2020-10-15 ENCOUNTER — HOSPITAL ENCOUNTER (OUTPATIENT)
Dept: RADIOLOGY | Facility: MEDICAL CENTER | Age: 85
End: 2020-10-15
Payer: MEDICARE

## 2020-10-15 ENCOUNTER — HOSPITAL ENCOUNTER (EMERGENCY)
Facility: MEDICAL CENTER | Age: 85
End: 2020-10-15
Attending: EMERGENCY MEDICINE
Payer: MEDICARE

## 2020-10-15 ENCOUNTER — APPOINTMENT (OUTPATIENT)
Dept: RADIOLOGY | Facility: MEDICAL CENTER | Age: 85
End: 2020-10-15
Attending: EMERGENCY MEDICINE
Payer: MEDICARE

## 2020-10-15 VITALS
HEIGHT: 72 IN | OXYGEN SATURATION: 98 % | BODY MASS INDEX: 18.96 KG/M2 | WEIGHT: 140 LBS | TEMPERATURE: 98 F | RESPIRATION RATE: 17 BRPM | DIASTOLIC BLOOD PRESSURE: 66 MMHG | HEART RATE: 68 BPM | SYSTOLIC BLOOD PRESSURE: 120 MMHG

## 2020-10-15 DIAGNOSIS — S22.49XA: ICD-10-CM

## 2020-10-15 DIAGNOSIS — S42.035A CLOSED NONDISPLACED FRACTURE OF ACROMIAL END OF LEFT CLAVICLE, INITIAL ENCOUNTER: ICD-10-CM

## 2020-10-15 DIAGNOSIS — S06.6X0A SUBARACHNOID HEMORRHAGE FOLLOWING INJURY, NO LOSS OF CONSCIOUSNESS, INITIAL ENCOUNTER (HCC): ICD-10-CM

## 2020-10-15 LAB
ALBUMIN SERPL BCP-MCNC: 4 G/DL (ref 3.2–4.9)
ALBUMIN/GLOB SERPL: 1.8 G/DL
ALP SERPL-CCNC: 50 U/L (ref 30–99)
ALT SERPL-CCNC: 13 U/L (ref 2–50)
ANION GAP SERPL CALC-SCNC: 10 MMOL/L (ref 7–16)
APTT PPP: 32.2 SEC (ref 24.7–36)
AST SERPL-CCNC: 18 U/L (ref 12–45)
BILIRUB SERPL-MCNC: 0.7 MG/DL (ref 0.1–1.5)
BUN SERPL-MCNC: 27 MG/DL (ref 8–22)
CALCIUM SERPL-MCNC: 9 MG/DL (ref 8.5–10.5)
CFT BLD TEG: 5.2 MIN (ref 5–10)
CHLORIDE SERPL-SCNC: 104 MMOL/L (ref 96–112)
CLOT ANGLE BLD TEG: 55.4 DEGREES (ref 53–72)
CLOT LYSIS 30M P MA LENFR BLD TEG: 0 % (ref 0–8)
CO2 SERPL-SCNC: 24 MMOL/L (ref 20–33)
CREAT SERPL-MCNC: 0.8 MG/DL (ref 0.5–1.4)
CT.EXTRINSIC BLD ROTEM: 3 MIN (ref 1–3)
ERYTHROCYTE [DISTWIDTH] IN BLOOD BY AUTOMATED COUNT: 47.5 FL (ref 35.9–50)
GLOBULIN SER CALC-MCNC: 2.2 G/DL (ref 1.9–3.5)
GLUCOSE SERPL-MCNC: 104 MG/DL (ref 65–99)
HCT VFR BLD AUTO: 43.6 % (ref 42–52)
HGB BLD-MCNC: 14 G/DL (ref 14–18)
INR PPP: 1.17 (ref 0.87–1.13)
MCF BLD TEG: 53.4 MM (ref 50–70)
MCH RBC QN AUTO: 32.3 PG (ref 27–33)
MCHC RBC AUTO-ENTMCNC: 32.1 G/DL (ref 33.7–35.3)
MCV RBC AUTO: 100.7 FL (ref 81.4–97.8)
PA AA BLD-ACNC: 2.1 %
PA ADP BLD-ACNC: 5 %
PLATELET # BLD AUTO: 170 K/UL (ref 164–446)
PMV BLD AUTO: 11 FL (ref 9–12.9)
POTASSIUM SERPL-SCNC: 4 MMOL/L (ref 3.6–5.5)
PROT SERPL-MCNC: 6.2 G/DL (ref 6–8.2)
PROTHROMBIN TIME: 15.3 SEC (ref 12–14.6)
RBC # BLD AUTO: 4.33 M/UL (ref 4.7–6.1)
SODIUM SERPL-SCNC: 138 MMOL/L (ref 135–145)
TEG ALGORITHM TGALG: NORMAL
WBC # BLD AUTO: 11.8 K/UL (ref 4.8–10.8)

## 2020-10-15 PROCEDURE — 96374 THER/PROPH/DIAG INJ IV PUSH: CPT

## 2020-10-15 PROCEDURE — 99284 EMERGENCY DEPT VISIT MOD MDM: CPT

## 2020-10-15 PROCEDURE — 700111 HCHG RX REV CODE 636 W/ 250 OVERRIDE (IP): Performed by: EMERGENCY MEDICINE

## 2020-10-15 PROCEDURE — 71045 X-RAY EXAM CHEST 1 VIEW: CPT

## 2020-10-15 PROCEDURE — 305948 HCHG GREEN TRAUMA ACT PRE-NOTIFY NO CC

## 2020-10-15 PROCEDURE — 85730 THROMBOPLASTIN TIME PARTIAL: CPT

## 2020-10-15 PROCEDURE — 85384 FIBRINOGEN ACTIVITY: CPT

## 2020-10-15 PROCEDURE — 85027 COMPLETE CBC AUTOMATED: CPT

## 2020-10-15 PROCEDURE — 85347 COAGULATION TIME ACTIVATED: CPT

## 2020-10-15 PROCEDURE — 80053 COMPREHEN METABOLIC PANEL: CPT

## 2020-10-15 PROCEDURE — 85610 PROTHROMBIN TIME: CPT

## 2020-10-15 PROCEDURE — 85576 BLOOD PLATELET AGGREGATION: CPT

## 2020-10-15 PROCEDURE — 96375 TX/PRO/DX INJ NEW DRUG ADDON: CPT

## 2020-10-15 RX ORDER — ONDANSETRON 2 MG/ML
INJECTION INTRAMUSCULAR; INTRAVENOUS
Status: COMPLETED | OUTPATIENT
Start: 2020-10-15 | End: 2020-10-15

## 2020-10-15 RX ORDER — MORPHINE SULFATE 4 MG/ML
INJECTION, SOLUTION INTRAMUSCULAR; INTRAVENOUS
Status: COMPLETED | OUTPATIENT
Start: 2020-10-15 | End: 2020-10-15

## 2020-10-15 RX ORDER — DORZOLAMIDE HYDROCHLORIDE AND TIMOLOL MALEATE 20; 5 MG/ML; MG/ML
1 SOLUTION/ DROPS OPHTHALMIC DAILY
COMMUNITY
Start: 2020-09-29 | End: 2020-10-15

## 2020-10-15 RX ADMIN — MORPHINE SULFATE 2 MG: 4 INJECTION INTRAVENOUS at 17:52

## 2020-10-15 RX ADMIN — ONDANSETRON 4 MG: 2 INJECTION INTRAMUSCULAR; INTRAVENOUS at 17:52

## 2020-10-15 ASSESSMENT — FIBROSIS 4 INDEX: FIB4 SCORE: 1.52

## 2020-10-16 NOTE — ED NOTES
Trauma green transfer from [place] for SAH and left clavicle fx. Pt was under his RV, says he got up to fast and hit his head, denies LOC but fell forward landing on left side.

## 2020-10-16 NOTE — ED TRIAGE NOTES
Chief Complaint   Patient presents with   • Trauma Green     SAH transfer for GLF from [facility] brought in by EMS for above.    /63   Pulse 63   Temp 36.7 °C (98 °F) (Temporal)   Resp 15   Ht 1.829 m (6')   Wt 63.5 kg (140 lb)   SpO2 96% Comment: RA  BMI 18.99 kg/m²

## 2020-10-16 NOTE — ED PROVIDER NOTES
ED Provider Note    Scribed for Conor Turner M.D. by Tarah Cook. 10/15/2020  6:29 PM    Primary care provider: Bill Bai M.D.  Means of arrival: EMS  History obtained from: Patient  History limited by: None    CHIEF COMPLAINT  Chief Complaint   Patient presents with   • Trauma Green     SAH transfer for GLF from [place]   Injury, chest pain, shoulder pain    HPI  Gagandeep Mauro is a 85 y.o. male with a history of hyperlipidemia and hypertension who presents to the Emergency Department via ambulance as a trauma green. EMS states that the patient was working on his RV in [place] earlier today when he stood up too fast and hit his head. Patient denies any loss of consciousness occurring during the event, but reports falling forward onto his left side after he hit his head. EMS adds that the patient suffered a left clavicle fracture during the event. Patient was then brought to the [facility] to have his symptoms further evaluated. No alleviating factors were noted, and pain is exacerbated by moving the left shoulder. Patient has associated left rib pain, left shoulder pain, and left clavicle pain, but denies abdominal pain, bilateral hip pain, or back pain. He also denies coming into contact with anyone who has tested positive for Covid-19 or taking any recent travels outside of the country. Patient smokes and drinks alcohol daily, but does not use drugs. He adds that he did not have any alcohol today. Patient is allergic to Tetracycline and takes Travatan daily. His PCP is Dr. Bai.     REVIEW OF SYSTEMS  Pertinent positives include  left rib pain, left shoulder pain, and left clavicle pain. Pertinent negatives include no abdominal pain, bilateral hip pain, or back pain.  All other systems reviewed and negative.    PAST MEDICAL HISTORY   has a past medical history of BPH (benign prostatic hyperplasia), Cataract, Glaucoma, Glucose intolerance (impaired glucose tolerance),  Hyperlipidemia, Hypertension, Prostate carcinoma (HCC), Tinnitus, Tobacco abuse, and Urinary urgency.    SURGICAL HISTORY   has a past surgical history that includes other surgical procedure; tonsillectomy; adenoidectomy; other orthopedic surgery; other surgical procedure; colonoscopy (2003); other; penetrating keratoplasty (Right, 1/30/2018); penetrating keratoplasty (Right, 2/27/2018); and penetrating keratoplasty (Right, 4/9/2019).    SOCIAL HISTORY  Social History     Tobacco Use   • Smoking status: Current Every Day Smoker     Packs/day: 1.00     Years: 62.00     Pack years: 62.00     Types: Cigarettes, Pipe   • Smokeless tobacco: Never Used   Substance Use Topics   • Alcohol use: Yes     Alcohol/week: 8.4 oz     Types: 14 Shots of liquor per week     Frequency: 4 or more times a week     Drinks per session: 1 or 2     Comment: Has two alcoholic beverages a day   • Drug use: No      Social History     Substance and Sexual Activity   Drug Use No       FAMILY HISTORY  No family history noted     CURRENT MEDICATIONS  Home Medications     Reviewed by Keyanna Underwood (Pharmacy Tech) on 10/15/20 at 4384  Med List Status: Complete   Medication Last Dose Status   dorzolamide-timolol (COSOPT) 22.3-6.8 MG/ML Solution 10/15/2020 Active   latanoprost (XALATAN) 0.005 % Solution 10/14/2020 Active   travoprost (TRAVATAN Z) 0.004 % Solution PRN Active                ALLERGIES  Allergies   Allergen Reactions   • Tetracycline        PHYSICAL EXAM  VITAL SIGNS: /63   Pulse 63   Temp 36.7 °C (98 °F) (Temporal)   Resp 15   Ht 1.829 m (6')   Wt 63.5 kg (140 lb)   SpO2 96% Comment: RA  BMI 18.99 kg/m²     Constitutional: Awake and alert, In no apparent distress, Speech fluent and apprpriate   HENT: Tongue midline without fasciculation, Oropharynx moist and clear, No exudates   Eyes: Pupils 3 mm midline, PERRLA, EOMI, Conjunctiva normal, No discharge.   Neck: No cervical spine tenderness, Neck is supple     Cardiovascular: Normal heart rate, Normal rhythm, No murmurs, No rubs, No gallops.   Thorax & Lungs: Normal breath sounds, No respiratory distress, No wheezing, No chest tenderness.   Abdomen: Bowel sounds normal, Soft, No tenderness, No masses, No pulsatile masses.   Skin: Laceration to the left forehead region, Warm, Dry, No rash.   Back: No thoracic or lumbar spine tenderness    Extremities: No edema, No tenderness, No cyanosis, No clubbing. Dorsalis pedis pulses 2+ equal bilaterally. Radial pulses 2+ equal bilaterally  Musculoskeletal: Tenderness to the left shoulder, mild tenderness over the left anterior chest patient has full range of motion of the left shoulder and is able to extend his shoulder fully.  Neurologic: Alert & oriented x 3, Normal motor function, Normal sensory function, No focal deficits noted.     LABS  Results for orders placed or performed during the hospital encounter of 10/15/20   CBC WITHOUT DIFFERENTIAL   Result Value Ref Range    WBC 11.8 (H) 4.8 - 10.8 K/uL    RBC 4.33 (L) 4.70 - 6.10 M/uL    Hemoglobin 14.0 14.0 - 18.0 g/dL    Hematocrit 43.6 42.0 - 52.0 %    .7 (H) 81.4 - 97.8 fL    MCH 32.3 27.0 - 33.0 pg    MCHC 32.1 (L) 33.7 - 35.3 g/dL    RDW 47.5 35.9 - 50.0 fL    Platelet Count 170 164 - 446 K/uL    MPV 11.0 9.0 - 12.9 fL   PLATELET MAPPING WITH BASIC TEG   Result Value Ref Range    Reaction Time Initial-R 5.2 5.0 - 10.0 min    Clot Kinetics-K 3.0 1.0 - 3.0 min    Clot Angle-Angle 55.4 53.0 - 72.0 degrees    Maximum Clot Strength-MA 53.4 50.0 - 70.0 mm    Lysis 30 minutes-LY30 0.0 0.0 - 8.0 %    % Inhibition ADP 5.0 %    % Inhibition AA 2.1 %    TEG Algorithm Link Algorithm    Prothrombin Time   Result Value Ref Range    PT 15.3 (H) 12.0 - 14.6 sec    INR 1.17 (H) 0.87 - 1.13   APTT   Result Value Ref Range    APTT 32.2 24.7 - 36.0 sec   Comp Metabolic Panel   Result Value Ref Range    Sodium 138 135 - 145 mmol/L    Potassium 4.0 3.6 - 5.5 mmol/L    Chloride 104 96 -  112 mmol/L    Co2 24 20 - 33 mmol/L    Anion Gap 10.0 7.0 - 16.0    Glucose 104 (H) 65 - 99 mg/dL    Bun 27 (H) 8 - 22 mg/dL    Creatinine 0.80 0.50 - 1.40 mg/dL    Calcium 9.0 8.5 - 10.5 mg/dL    AST(SGOT) 18 12 - 45 U/L    ALT(SGPT) 13 2 - 50 U/L    Alkaline Phosphatase 50 30 - 99 U/L    Total Bilirubin 0.7 0.1 - 1.5 mg/dL    Albumin 4.0 3.2 - 4.9 g/dL    Total Protein 6.2 6.0 - 8.2 g/dL    Globulin 2.2 1.9 - 3.5 g/dL    A-G Ratio 1.8 g/dL   ESTIMATED GFR   Result Value Ref Range    GFR If African American >60 >60 mL/min/1.73 m 2    GFR If Non African American >60 >60 mL/min/1.73 m 2      All labs reviewed by me.    RADIOLOGY  DX-CHEST-LIMITED (1 VIEW)   Final Result      1.  Fracture of the posterior left first rib with adjacent pleural thickening/hemorrhage. No pneumothorax is identified.      2.  Nondisplaced distal left clavicle fracture.      3.  Questionable fracture in the posterolateral right eighth rib.      OUTSIDE IMAGES-CT HEAD   Final Result      OUTSIDE IMAGES-CT CERVICAL SPINE   Final Result      OUTSIDE IMAGES-DX UPPER EXTREMITY, LEFT   Final Result      [id number] FILM X-RAY   Final Result        The radiologist's interpretation of all radiological studies have been reviewed by me.    COURSE & MEDICAL DECISION MAKING  Pertinent Labs & Imaging studies reviewed. (See chart for details) Unable to review old medical records due to trauma registration.     5:43 PM - Patient seen and examined at bedside. Discussed plan of care with patient. I informed them that labs and imaging will be ordered to evaluate symptoms. The patient is understanding and agreeable with plan of care.Patient will be treated with Zofran injection and 4 mg/mL Morphine. Ordered DX-Chest-Limited (1 View), CBC w/o Differential, Platelet Mapping w/ Basic Teg, Prothrombin Time, APTT, and CMP to evaluate his symptoms.     5:51 PM Paged Trauma.      5:52 PM Paged Neuro Surgery.  Dr. Rodriguez felt that the patient could be  discharged home.  I felt that to send an 85-year-old home with a traumatic subarachnoid hemorrhage and rib fracture was concerning and asked Dr. Clement to see this patient    5:54 PM I discussed the patient's case and the above findings with Dr. Negrete (Trauma) who agrees with the patient and agrees to consult with the patient.      6:01 PM 6:45 PM I discussed the patient's case and the above findings with Dr. Rodriguez (Neuro Surgery) who agrees with the plan of care and agrees to consult with the patient.  The trauma surgeon stated the patient could be discharged home.  I argued that the patient was 85 and had both hemorrhagic traumatic subarachnoid hemorrhage as well as a first rib fracture and should be observed in the hospital however he was adamant that the patient did not have any acute medical or surgical needs      8:05 PM Patient was reevaluated at bedside. Discussed lab and radiology results with the patient and informed them that there were acute and abnormal findings as noted above. The plan of care was discussed with the patient. He was informed of the conversations with Dr. Negrete and Dr. Rodriguez. He is understanding and agreeable to the plan of care. Patient had the opportunity to ask any questions.     DISPOSITION:  Patient will be hospitalized by Dr. Jung in guarded condition.    FINAL IMPRESSION  1. Subarachnoid hemorrhage following injury, no loss of consciousness, initial encounter ([facility])     2. Multiple fractures of rib involving first rib     3. Closed nondisplaced fracture of acromial end of left clavicle, initial encounter           Tarah VELEZ (Scribjessie), am scribing for, and in the presence of, Conor Turner M.D..    Electronically signed by: Tarah Cook (Felisha), 10/15/2020    Conor VELEZ M.D. personally performed the services described in this documentation, as scribed by Tarah Cook in my presence, and it is both accurate and complete. C    The note accurately  reflects work and decisions made by me.  Conor Turner M.D.  10/15/2020  10:06 PM

## 2020-10-16 NOTE — CONSULTS
Trauma consult  10/15/2020        CC: Trauma The patient was triaged as a Trauma Green Transfer in accordance with established pre hospital protols. An expeditious primary and secondary survey with required adjuncts was conducted. See Trauma Narrator for full details.    HPI: This is a very pleasant 85 y.o. Gagandeep Gallegos.  Transfer green report fall from standing subarachnoid hemorrhage transfer for neurosurgical evaluation   He is awake alert and appropriate.  He states he was working under his RV.  He states he hit his head.  He he states when he went to stand he felt dizzy and fell injuring his left side..  He states no loss of consciousness.  He states no headache no dizziness no change in vision no nausea.  He states no chest pain no difficulty breathing.  He states no abdominal pain.  Reports some discomfort description his hands but no extremity pain.  Reports chronic back pain.  Past Medical History:   Diagnosis Date   • BPH (benign prostatic hyperplasia)     [facility]   • Cataract    • Glaucoma     Dr. Pickard   • Glucose intolerance (impaired glucose tolerance)    • Hyperlipidemia    • Hypertension     elevated microalbumin in the past   • Prostate carcinoma (HCC)    • Tinnitus    • Tobacco abuse    • Urinary urgency        Past Surgical History:   Procedure Laterality Date   • PENETRATING KERATOPLASTY Right 4/9/2019    Procedure: KERATOPLASTY, PENETRATING;  Surgeon: Yeyo Heath M.D.;  Location: SURGERY SAME DAY [facility];  Service: Ophthalmology   • PENETRATING KERATOPLASTY Right 2/27/2018    Procedure: PENETRATING KERATOPLASTY;  Surgeon: Yeyo Heath M.D.;  Location: SURGERY SAME DAY [facility];  Service: Ophthalmology   • PENETRATING KERATOPLASTY Right 1/30/2018    Procedure: DESCEMETS STRIPPING ENDOTHELIAL KERATOPLASTY;  Surgeon: Yeyo Heath M.D.;  Location: SURGERY SAME DAY [facility];  Service: Ophthalmology   • COLONOSCOPY  2003   • ADENOIDECTOMY     • OTHER       prostate   • OTHER ORTHOPEDIC SURGERY      previous right knee scope   • OTHER SURGICAL PROCEDURE      cataracts surgery   • OTHER SURGICAL PROCEDURE      cyberknife to prostate   • TONSILLECTOMY         No current facility-administered medications for this encounter.      Current Outpatient Medications   Medication Sig Dispense Refill   • dorzolamide-timolol (COSOPT) 22.3-6.8 MG/ML Solution 1 Drop by Ophthalmic route every day.     • travoprost (TRAVATAN Z) 0.004 % Solution 1 Drop by Ophthalmic route every day.     • latanoprost (XALATAN) 0.005 % Solution Place 1 Drop in both eyes every evening.     • MYRBETRIQ 50 MG TABLET SR 24 HR Take 1 Tab by mouth every day.     • prednisoLONE acetate (PRED FORTE) 1 % Suspension INSTILL 1 DROP INTO RIGHT EYE 4 TIMES DAILY  3   • dorzolamide-timolol (COSOPT) 22.3-6.8 MG/ML Solution Place 1 Drop in both eyes 2 times a day.     • acyclovir (ZOVIRAX) 400 MG tablet Take 400 mg by mouth every day.         Social History     Socioeconomic History   • Marital status:      Spouse name: Gena   • Number of children: 2   • Years of education: Not on file   • Highest education level: Not on file   Occupational History   • Occupation: retired     Employer: retired   Social Needs   • Financial resource strain: Not on file   • Food insecurity     Worry: Not on file     Inability: Not on file   • Transportation needs     Medical: Not on file     Non-medical: Not on file   Tobacco Use   • Smoking status: Current Every Day Smoker     Packs/day: 1.00     Years: 62.00     Pack years: 62.00     Types: Cigarettes, Pipe   • Smokeless tobacco: Never Used   Substance and Sexual Activity   • Alcohol use: Yes     Alcohol/week: 8.4 oz     Types: 14 Shots of liquor per week     Frequency: 4 or more times a week     Drinks per session: 1 or 2     Comment: Has two alcoholic beverages a day   • Drug use: No   • Sexual activity: Not on file     Comment: ,retired   Lifestyle   • Physical  activity     Days per week: Not on file     Minutes per session: Not on file   • Stress: Not on file   Relationships   • Social connections     Talks on phone: Not on file     Gets together: Not on file     Attends Hinduism service: Not on file     Active member of club or organization: Not on file     Attends meetings of clubs or organizations: Not on file     Relationship status: Not on file   • Intimate partner violence     Fear of current or ex partner: Not on file     Emotionally abused: Not on file     Physically abused: Not on file     Forced sexual activity: Not on file   Other Topics Concern   •  Service Yes   • Blood Transfusions No   • Caffeine Concern No   • Occupational Exposure No   • Hobby Hazards No   • Sleep Concern No   • Stress Concern Yes     Comment: worried about wifes health   • Weight Concern No   • Special Diet No   • Back Care No   • Exercise No   • Bike Helmet No   • Seat Belt Yes   • Self-Exams No   Social History Narrative   • Not on file   Patient counseled to quit smoking.    He reports he drinks 1 whiskey drink per day he states he does not have an alcohol problem    No family history on file.    Allergies:  Tetracycline    Review of Systems:      Physical Exam:  /63   Pulse 63   Temp 36.7 °C (98 °F) (Temporal)   Resp 15   Ht 1.829 m (6')   Wt 63.5 kg (140 lb)   SpO2 96%     Constitutional: Awake, alert, oriented x3. No acute distress. GCS 15. E4 V5 M6.  Head: Posterior and frontal scalp laceration, cephalohematoma.  No active bleeding no bleeding from ears nose or mouth.  Neck: No tracheal deviation. No stridor.  Cardiovascular: Regular rate skin warm brisk capillary refill.  Pulmonary/Chest: Known clavicle fracture.  Pain left shoulder.  Able to reach above his head.  No crepitus. Positive breath sounds bilaterally.   Abdominal: Soft, nondistended.Nontender to palpation. Pelvis is stable to anterior-posterior compression. No abdominal seatbelt sign.    Musculoskeletal: Abrasions to the hands bilaterally using at the wrists   No tenderness in the lower extremities   back: Midline thoracic and lumbar spines are tender to palpation. No step-offs.  He reports chronic back pain unchanged since fall .   Neurological: Awake alert friendly cooperative skin: Skin is warm and dry.  No diaphoresis. .   Psychiatric:  Normal mood and affect.  Behavior is appropriate.       Labs:  Recent Labs     10/15/20  1744   WBC 11.8*   RBC 4.33*   HEMOGLOBIN 14.0   HEMATOCRIT 43.6   .7*   MCH 32.3   MCHC 32.1*   RDW 47.5   PLATELETCT 170   MPV 11.0                   Radiology:  DX-CHEST-LIMITED (1 VIEW)   Final Result      1.  Fracture of the posterior left first rib with adjacent pleural thickening/hemorrhage. No pneumothorax is identified.      2.  Nondisplaced distal left clavicle fracture.      3.  Questionable fracture in the posterolateral right eighth rib.      OUTSIDE IMAGES-CT HEAD   Final Result      OUTSIDE IMAGES-CT CERVICAL SPINE   Final Result      OUTSIDE IMAGES-DX UPPER EXTREMITY, LEFT   Final Result      [id number] FILM X-RAY   Final Result            Assessment: This is a 85 y.o. male report fall from standing  Transfer for evaluation of neurosurgery  Seen neurosurgery as above  Orthopedic evaluation  Plan:   There are no active hospital problems to display for this patient.  Discussed findings with Mr. Gagandeep Mauro  He reports he lives alone.  He states there is no one to assist him  Discussed medical recommendation for observation in the hospital  He had already spoken with neurosurgery as above  He declined admission  Discussed risks to include worsening outcome even death  He states he does not need to be in the hospital and chooses to go home  Discussed that he was welcome to change his mind to return for care at any time       critical care time spent 55 minutes    Isacc Negrete MD, FACS  [facility]  [phone]

## 2020-10-16 NOTE — CONSULTS
[facility] [place] Neurosurgery  Consultation  Referring physician: ER physician  Reason for consultation small amount of traumatic right convexity subarachnoid hemorrhage    Author: Amadou Rodriguez M.D. Date & Time created: 10/15/2020  6:22 PM     Interval History   No notes available for review.  Per patient, he stood up and fell earlier today.  He apparently suffered a clavicle fracture, went to the ER for this.  He was evaluated at the outside hospital, head CT was obtained per protocol, this demonstrated a very tiny amount of right frontal convexity subarachnoid hemorrhage.  He denies headache numbness weakness.  He was transferred to [facility] for further evaluation.    ROS  Per H&P  Physical Exam    PHYSICAL EXAMINATION:    Awake, alert   Speech fluent appropriate  In no apparent distress  Affect, mood appropriate  Oriented x 3  Pupils 3 mm midline, reactive.  Conjugate gaze.  Visual fields full to confrontation  Face symmetric  Tongue midline without fasciculation  Facial sensation intact light touch  Hearing intact to conversation, light finger rub bilaterally  Motor:  Bilateral SCM, shoulder shrug, deltoid, bicep, tricep, , wrist extension, hand intrinsics                IP, thigh adduction, thigh abduction, quadriceps, hamstrings, dorsiflexion,                Plantar flexion, foot inversion, foot eversion, EHL 5/5 no pronator drift  Sensation:  Intact touch face, neck, torso, four extremities  Reflex:  No Vasquez's no clonus  Finger-nose-finger, DARREN unremarkable      Patient Active Problem List    Diagnosis Date Noted   • Nonrheumatic aortic valve stenosis 2020   • Tobacco use 2019   • Glaucoma 2019   • Pre-diabetes 2019   • Hyperlipidemia 2019   • Prostate CA (HCC) 2019   • Hypertension          Temp (24hrs), Av.7 °C (98 °F), Min:36.7 °C (98 °F), Max:36.7 °C (98 °F)    Pulse: 63, Respiration: 15, Blood Pressure : 136/63, Weight: 63.5 kg (140 lb), Pulse  Oximetry: 96 %(RA)     Date 10/15/20 0700 - 10/16/20 0659   Shift 8313-1706 1102-9586 5073-0750 24 Hour Total   INTAKE   I.V.  0  0     Pre-Hospital Volume  0  0     Trauma Resuscitation Volume  0  0   Blood  0  0     PRBC Total Volume (Non-Barcoded)  0  0     FFP Total Volume (Non-Barcoded)  0  0     Platelets Total Volume (Non-Barcoded)  0  0     Cryoprecipitate (Pooled) Total Volume (Non-Barcoded)  0  0   Shift Total  0  0   OUTPUT   Other  0  0     Pre-Hospital Output  0  0     Trauma Resuscitation Output  0  0   Blood  0  0     Est. Blood Loss  0  0   Shift Total  0  0   NET  0  0         Intake/Output Summary (Last 24 hours) at 10/15/2020 1822  Last data filed at 10/15/2020 1751  Gross per 24 hour   Intake 0 ml   Output 0 ml   Net 0 ml                Recent Labs     10/15/20  1744   WBC 11.8*   RBC 4.33*   HEMOGLOBIN 14.0   HEMATOCRIT 43.6   .7*   MCH 32.3   PLATELETCT 170     No results for input(s): SODIUM, ISTATSODIUM, POTASSIUM, ISTATPOTASS, CHLORIDE, ISTATCHLORI, CO2, ISTATCO2, GLUCOSE, ISTATGLUCOS, BUN, ISTATBUN, CREATININE, ISTATCREAT, CALCIUM in the last 72 hours.      Assessment and plan: 85-year-old male with a very very tiny amount of convexity subarachnoid hemorrhage involving the right frontal region.  This is of no clinical consequence.  He denies aspirin, antiplatelet, or anticoagulant use.  Given this, no follow-up is indicated.  Neurosurgery will sign off.

## 2020-11-02 PROBLEM — I10 HYPERTENSION: Status: ACTIVE | Noted: 2020-10-15

## 2021-03-24 PROBLEM — R35.0 INCREASED FREQUENCY OF MICTURITION: Status: ACTIVE | Noted: 2021-03-24

## 2021-11-24 PROBLEM — K56.609 SMALL BOWEL OBSTRUCTION (HCC): Status: ACTIVE | Noted: 2021-11-24

## 2021-11-24 PROBLEM — J96.01 ACUTE RESPIRATORY FAILURE WITH HYPOXIA (HCC): Status: ACTIVE | Noted: 2021-11-24

## 2021-11-24 PROBLEM — J18.9 PNEUMONIA: Status: ACTIVE | Noted: 2021-11-24

## 2022-05-04 PROBLEM — J96.01 ACUTE RESPIRATORY FAILURE WITH HYPOXIA (HCC): Status: RESOLVED | Noted: 2021-11-24 | Resolved: 2022-05-04

## 2023-07-12 PROBLEM — N39.41 URGE INCONTINENCE: Status: ACTIVE | Noted: 2023-07-12

## 2023-08-31 PROBLEM — I71.40 ABDOMINAL AORTIC ANEURYSM (AAA) WITHOUT RUPTURE (HCC): Status: ACTIVE | Noted: 2023-08-31

## (undated) DEVICE — KIT  I.V. START (100EA/CA)

## (undated) DEVICE — Device

## (undated) DEVICE — SENSOR SPO2 NEO LNCS ADHESIVE (20/BX) SEE USER NOTES

## (undated) DEVICE — CANISTER SUCTION RIGID RED 1500CC (40EA/CA)

## (undated) DEVICE — CANISTER SUCTION 3000ML MECHANICAL FILTER AUTO SHUTOFF MEDI-VAC NONSTERILE LF DISP  (40EA/CA)

## (undated) DEVICE — CONTAINER SPECIMEN BAG OR - STERILE 4 OZ W/LID (100EA/CA)

## (undated) DEVICE — CATHETER IV 20 GA X 1-1/4 ---SURG.& SDS ONLY--- (50EA/BX)

## (undated) DEVICE — NEEDLE  NON-SAFETY 30GA X 3/4 IN (10EA/CA)

## (undated) DEVICE — SYRINGE SAFETY 3 ML 18 GA X 1 1/2 BLUNT LL (100/BX 8BX/CA)

## (undated) DEVICE — SUCTION INSTRUMENT YANKAUER BULBOUS TIP W/O VENT (50EA/CA)

## (undated) DEVICE — KNIFE MICROSURGICAL 15 DEGREE GREEN

## (undated) DEVICE — SET EXTENSION WITH 2 PORTS (48EA/CA) ***PART #2C8610 IS A SUBSTITUTE*****

## (undated) DEVICE — WATER IRRIGATION STERILE 1000ML (12EA/CA)

## (undated) DEVICE — PACK BASIC CATARACT - (4/BX)

## (undated) DEVICE — KNIFE VITRECTOMY 20GA STRAIGHT (6EA/BX)

## (undated) DEVICE — SUTURE 10-0 NYLON (12EA/BX)

## (undated) DEVICE — SODIUM CHL IRRIGATION 0.9% 1000ML (12EA/CA)

## (undated) DEVICE — TIP POLYMER I&A

## (undated) DEVICE — PAD EYE GAUZE COVERED OVAL 1 5/8 X 2 5/8" STERILE"

## (undated) DEVICE — PUNCH CORNEAL VACUUM 8.25

## (undated) DEVICE — TUBING CLEARLINK DUO-VENT - C-FLO (48EA/CA)

## (undated) DEVICE — SHIELD OPTH AL GRTR CVR FOX (50EA/BX)

## (undated) DEVICE — LACTATED RINGERS INJ 1000 ML - (14EA/CA 60CA/PF)

## (undated) DEVICE — KIT ANESTHESIA W/CIRCUIT & 3/LT BAG W/FILTER (20EA/CA)

## (undated) DEVICE — CLOSURE SKIN STRIP 1/2 X 4 IN - (STERI STRIP) (50/BX 4BX/CA)

## (undated) DEVICE — GLOVE BIOGEL SZ 7.5 SURGICAL PF LTX - (50PR/BX 4BX/CA)

## (undated) DEVICE — CANNULA SUB-TENONS ANESTH. 1.1X25MM 19GAX1IN (10EA/SP)

## (undated) DEVICE — SYRINGE SAFETY TB 1 ML 27 GA X 1/2 IN (100/BX 4BX/CA)

## (undated) DEVICE — KNIFE CRESCENT ANGLED 55DEG - EYE (10/SP)

## (undated) DEVICE — SPEAR EYE SPNG 3ANG MLBL HNDL - (10/ST18ST/PK 180/PK 1PK/SP)

## (undated) DEVICE — NEEDLE NON SAFETY 27GA X 1-1/4 IN HYPO (100EA/BX)

## (undated) DEVICE — SYRINGE SAFETY 10 ML 18 GA X 1 1/2 BLUNT LL (100/BX 4BX/CA)

## (undated) DEVICE — ELECTRODE 850 FOAM ADHESIVE - HYDROGEL RADIOTRNSPRNT (50/PK)

## (undated) DEVICE — SET LEADWIRE 5 LEAD BEDSIDE DISPOSABLE ECG (1SET OF 5/EA)

## (undated) DEVICE — TUBE CONNECTING SUCTION - CLEAR PLASTIC STERILE 72 IN (50EA/CA)

## (undated) DEVICE — MEDICINE CUP STERILE 2 OZ - (100/CA)

## (undated) DEVICE — PEN SKIN MARKER W/RULER - (50EA/BX)

## (undated) DEVICE — CONTAINER, SPECIMEN, STERILE

## (undated) DEVICE — CON SEDATION EA ADDL 15 MIN

## (undated) DEVICE — GLOVE BIOGEL SZ 8.5 SURGICAL PF LTX - (50PR/BX 4BX/CA)

## (undated) DEVICE — MASK ANESTHESIA ADULT  - (100/CA)

## (undated) DEVICE — KNIFE 2.75MM ANGL SNGL BEV/SAFETY (10/CA 10/SP)

## (undated) DEVICE — CON SEDATION/>5 YR 1ST 15 MIN